# Patient Record
Sex: FEMALE | Race: WHITE | NOT HISPANIC OR LATINO | Employment: OTHER | URBAN - METROPOLITAN AREA
[De-identification: names, ages, dates, MRNs, and addresses within clinical notes are randomized per-mention and may not be internally consistent; named-entity substitution may affect disease eponyms.]

---

## 2018-05-04 ENCOUNTER — TELEPHONE (OUTPATIENT)
Dept: BARIATRICS | Facility: CLINIC | Age: 43
End: 2018-05-04

## 2018-05-04 NOTE — TELEPHONE ENCOUNTER
Patient called stating that she was faxing over medical release information signed and completed so we can fax to her PCP and other medical doctors  She is interested in revision after weight gain  Surgery in 2005  LM with [facility] Faxing to look for above

## 2018-05-15 NOTE — TELEPHONE ENCOUNTER
Received email from patient asking if we received any of the records  I emailed her back stating that I faxed all the papers over to the doctors she signed releases for  She tried to contact those other providers  I called central faxing to find the copies of the releases so I can call the offices myself and see when they will be releasing the records to us

## 2018-05-16 NOTE — TELEPHONE ENCOUNTER
Have records from [facility]  Also received records from [facility]   for  [facility] and [facility] waiting to hear back on if/when they faxed records

## 2018-06-06 NOTE — TELEPHONE ENCOUNTER
Received fax from [facility]  Cover sheet stated that they originally faxed all the records on 5/9/2018 to [phone]  Which is close to the Lyn fax #   I sent the cover sheet back correcting the fax #  Send email to patient stating that we now have records of [facility] and are still waiting for [facility]

## 2018-06-06 NOTE — TELEPHONE ENCOUNTER
LM again with [facility] asking for status of YAMILETH since it was faxed over than 30 days ago

## 2018-06-07 NOTE — TELEPHONE ENCOUNTER
Received call from [facility] stating that 28 pages were faxed over to [phone] on 5/16/2018 at 10:59am    for central faxing to check for the fax and call me back

## 2018-06-08 NOTE — TELEPHONE ENCOUNTER
Received records via central faxing  Advised patient that the records would be interofficed to the [facility] for surgeon review and someone from that office would be contacting her  [facility] office aware that records are being interofficed to them

## 2018-06-20 DIAGNOSIS — Z98.84 BARIATRIC SURGERY STATUS: Primary | ICD-10-CM

## 2018-06-27 ENCOUNTER — HOSPITAL ENCOUNTER (OUTPATIENT)
Dept: RADIOLOGY | Facility: HOSPITAL | Age: 43
Discharge: HOME | End: 2018-06-27
Payer: COMMERCIAL

## 2018-06-27 DIAGNOSIS — Z98.84 BARIATRIC SURGERY STATUS: ICD-10-CM

## 2018-06-27 PROCEDURE — 74240 X-RAY XM UPR GI TRC 1CNTRST: CPT

## 2018-07-16 RX ORDER — DIAZEPAM 5 MG/1
TABLET ORAL
COMMUNITY
Start: 2018-06-26

## 2018-07-16 RX ORDER — METOPROLOL SUCCINATE 50 MG/1
50 TABLET, EXTENDED RELEASE ORAL
COMMUNITY

## 2018-07-16 RX ORDER — BACLOFEN 10 MG/1
TABLET ORAL
Status: ON HOLD | COMMUNITY
Start: 2017-11-03 | End: 2018-08-29 | Stop reason: ALTCHOICE

## 2018-07-16 RX ORDER — DULOXETIN HYDROCHLORIDE 60 MG/1
CAPSULE, DELAYED RELEASE ORAL
Status: ON HOLD | COMMUNITY
Start: 2017-11-29 | End: 2018-08-29 | Stop reason: ALTCHOICE

## 2018-07-16 RX ORDER — PREGABALIN 150 MG/1
CAPSULE ORAL 2 TIMES DAILY
COMMUNITY
Start: 2017-12-01

## 2018-07-16 RX ORDER — CLONAZEPAM 0.5 MG/1
TABLET ORAL
COMMUNITY
Start: 2018-05-03

## 2018-07-16 RX ORDER — METHOCARBAMOL 750 MG/1
TABLET, FILM COATED ORAL EVERY 8 HOURS SCHEDULED
COMMUNITY
Start: 2017-11-28

## 2018-07-16 RX ORDER — PANTOPRAZOLE SODIUM 20 MG/1
40 TABLET, DELAYED RELEASE ORAL
COMMUNITY
Start: 2018-01-12 | End: 2019-01-12

## 2018-07-19 ENCOUNTER — OFFICE VISIT (OUTPATIENT)
Dept: BARIATRICS | Facility: CLINIC | Age: 43
End: 2018-07-19
Payer: COMMERCIAL

## 2018-07-19 VITALS
WEIGHT: 206 LBS | HEART RATE: 77 BPM | TEMPERATURE: 98 F | BODY MASS INDEX: 40.44 KG/M2 | HEIGHT: 60 IN | DIASTOLIC BLOOD PRESSURE: 70 MMHG | SYSTOLIC BLOOD PRESSURE: 112 MMHG | RESPIRATION RATE: 18 BRPM

## 2018-07-19 DIAGNOSIS — Z98.84 BARIATRIC SURGERY STATUS: ICD-10-CM

## 2018-07-19 DIAGNOSIS — R63.5 ABNORMAL WEIGHT GAIN: ICD-10-CM

## 2018-07-19 DIAGNOSIS — E66.01 MORBID (SEVERE) OBESITY DUE TO EXCESS CALORIES (HCC): Primary | ICD-10-CM

## 2018-07-19 DIAGNOSIS — K91.2 POSTSURGICAL MALABSORPTION: ICD-10-CM

## 2018-07-19 PROCEDURE — 99213 OFFICE O/P EST LOW 20 MIN: CPT | Performed by: SURGERY

## 2018-07-19 RX ORDER — LORAZEPAM 1 MG/1
TABLET ORAL
Refills: 0 | Status: ON HOLD | COMMUNITY
Start: 2018-05-02 | End: 2018-08-29 | Stop reason: SDUPTHER

## 2018-07-19 RX ORDER — TRAZODONE HYDROCHLORIDE 50 MG/1
25 TABLET ORAL DAILY
COMMUNITY
Start: 2018-07-06 | End: 2018-08-05

## 2018-07-19 RX ORDER — OXYCODONE AND ACETAMINOPHEN 7.5; 325 MG/1; MG/1
TABLET ORAL
Refills: 0 | COMMUNITY
Start: 2018-07-11

## 2018-07-19 RX ORDER — SERTRALINE HYDROCHLORIDE 25 MG/1
TABLET, FILM COATED ORAL
COMMUNITY
Start: 2018-07-06

## 2018-07-19 NOTE — PROGRESS NOTES
INITIAL VISIT - BARIATRIC SURGERY  Chelo Sheldon 44 y o  female MRN: [id number]  Unit/Bed#:  Encounter: [id number]      HPI:  Chelo Sheldon is a 44 y o  female who presents with a history of gastric bypass in 2005 by Dr Tejal De Jesus  She has been gaining weight  Review of Systems   All other systems reviewed and are negative  Historical Information   Past Medical History:   Diagnosis Date    Acid reflux     Anxiety     Arthritis     Depression     Heartburn     Hypertelorism     Lumbar disc disease     Morbid obesity ([address] )      Past Surgical History:   Procedure Laterality Date    APPENDECTOMY      BACK SURGERY      GALLBLADDER SURGERY      GASTRIC BYPASS  2005    HERNIA REPAIR      TONSILLECTOMY       Social History   History   Alcohol Use No     History   Drug Use No     History   Smoking Status    Never Smoker   Smokeless Tobacco    Never Used     Family History: non-contributory    Meds/Allergies   all medications and allergies reviewed  Allergies   Allergen Reactions    Hydromorphone Itching    Morphine Itching       Objective       Current Vitals:   Blood Pressure: 112/70 (07/19/18 1119)  Pulse: 77 (07/19/18 1119)  Temperature: 98 °F (36 7 °C) (07/19/18 1119)  Respirations: 18 (07/19/18 1119)  Height: 5' (152 4 cm) (07/19/18 1119)  Weight - Scale: 93 4 kg (206 lb) (07/19/18 1119)        Invasive Devices          No matching active lines, drains, or airways          Physical Exam   Constitutional: She is oriented to person, place, and time  She appears well-developed and well-nourished  No distress  Neurological: She is alert and oriented to person, place, and time  Psychiatric: She has a normal mood and affect  Her behavior is normal  Judgment and thought content normal    Vitals reviewed  Lab Results: I have personally reviewed pertinent lab results  Imaging: I have personally reviewed pertinent reports      EKG, Pathology, and Other Studies: I have personally reviewed pertinent reports  She had an upper GI done on June 20th of this year  No suspicious abnormality seen  The esophagus and gastric pouch and visualized proximal small bowel appear relatively typical after Daniel-en-Y gastric bypass procedure  Assessment/PLAN:    44 y o  female with a history of laparoscopic gastric bypass in 2005 by Dr Leroy Zamora  At her highest weight was 261 pounds and at her usha she went down to 173  Over the last 5 years she has regained 33 pounds back  Within the last 8 years she has had 3 back surgeries that have significantly impaired her mobility and ability to perform physical activity  She has intermittent nausea and heartburn  I am scheduling her to have an EGD to further evaluate the anatomy of her gastric bypass and to rule out any potential reason that would explain her weight gain  I am ordering nutritional labs to assess her status  I had a detailed discussion with her stressing the fact that long-term success is largely dependent on compliance and abidance to the recommendations of the program as well as participation within the support groups  She is committed to continue to observe her diet and to increase her physical activity  She will follow up with us as scheduled after the endoscopy is done      Tatyana Zendejas MD  7/19/2018  11:35 AM

## 2018-07-19 NOTE — LETTER
July 19, 2018     [address] [facility] [address]    Patient: Kylie Cotton   YOB: 1975   Date of Visit: 7/19/2018       Dear Dr Karen Ashford: Thank you for referring Waqar Souza to me for evaluation  Below are my notes for this consultation  If you have questions, please do not hesitate to call me  I look forward to following your patient along with you  Sincerely,        Willy Messina MD        CC: No Recipients  Willy Messina MD  7/19/2018 11:47 AM  Sign at close encounter    INITIAL VISIT - BARIATRIC SURGERY  Kylie Cotton 44 y o  female MRN: [id number]  Unit/Bed#:  Encounter: [id number]      HPI:  Kylie Cotton is a 44 y o  female who presents with a history of gastric bypass in 2005 by Dr Ria Blanton  She has been gaining weight  Review of Systems   All other systems reviewed and are negative        Historical Information   Past Medical History:   Diagnosis Date    Acid reflux     Anxiety     Arthritis     Depression     Heartburn     Hypertelorism     Lumbar disc disease     Morbid obesity ([address] )      Past Surgical History:   Procedure Laterality Date    APPENDECTOMY      BACK SURGERY      GALLBLADDER SURGERY      GASTRIC BYPASS  2005    HERNIA REPAIR      TONSILLECTOMY       Social History   History   Alcohol Use No     History   Drug Use No     History   Smoking Status    Never Smoker   Smokeless Tobacco    Never Used     Family History: non-contributory    Meds/Allergies   all medications and allergies reviewed  Allergies   Allergen Reactions    Hydromorphone Itching    Morphine Itching       Objective       Current Vitals:   Blood Pressure: 112/70 (07/19/18 1119)  Pulse: 77 (07/19/18 1119)  Temperature: 98 °F (36 7 °C) (07/19/18 1119)  Respirations: 18 (07/19/18 1119)  Height: 5' (152 4 cm) (07/19/18 1119)  Weight - Scale: 93 4 kg (206 lb) (07/19/18 1119)        Invasive Devices          No matching active lines, drains, or airways          Physical Exam   Constitutional: She is oriented to person, place, and time  She appears well-developed and well-nourished  No distress  Neurological: She is alert and oriented to person, place, and time  Psychiatric: She has a normal mood and affect  Her behavior is normal  Judgment and thought content normal    Vitals reviewed  Lab Results: I have personally reviewed pertinent lab results  Imaging: I have personally reviewed pertinent reports  EKG, Pathology, and Other Studies: I have personally reviewed pertinent reports  She had an upper GI done on June 20th of this year  No suspicious abnormality seen  The esophagus and gastric pouch and visualized proximal small bowel appear relatively typical after Daniel-en-Y gastric bypass procedure  Assessment/PLAN:    44 y o  female with a history of laparoscopic gastric bypass in 2005 by Dr Anne Avery  At her highest weight was 261 pounds and at her usha she went down to 173  Over the last 5 years she has regained 33 pounds back  Within the last 8 years she has had 3 back surgeries that have significantly impaired her mobility and ability to perform physical activity  She has intermittent nausea and heartburn  I am scheduling her to have an EGD to further evaluate the anatomy of her gastric bypass and to rule out any potential reason that would explain her weight gain  I am ordering nutritional labs to assess her status  I had a detailed discussion with her stressing the fact that long-term success is largely dependent on compliance and abidance to the recommendations of the program as well as participation within the support groups  She is committed to continue to observe her diet and to increase her physical activity  She will follow up with us as scheduled after the endoscopy is done      Ellie Vargas MD  7/19/2018  11:35 AM

## 2018-08-28 ENCOUNTER — ANESTHESIA EVENT (OUTPATIENT)
Dept: GASTROENTEROLOGY | Facility: HOSPITAL | Age: 43
End: 2018-08-28
Payer: COMMERCIAL

## 2018-08-29 ENCOUNTER — ANESTHESIA (OUTPATIENT)
Dept: GASTROENTEROLOGY | Facility: HOSPITAL | Age: 43
End: 2018-08-29
Payer: COMMERCIAL

## 2018-08-29 ENCOUNTER — HOSPITAL ENCOUNTER (OUTPATIENT)
Facility: HOSPITAL | Age: 43
Setting detail: OUTPATIENT SURGERY
Discharge: HOME | End: 2018-08-29
Attending: SURGERY | Admitting: SURGERY
Payer: COMMERCIAL

## 2018-08-29 VITALS
WEIGHT: 207 LBS | HEART RATE: 60 BPM | BODY MASS INDEX: 40.64 KG/M2 | HEIGHT: 60 IN | DIASTOLIC BLOOD PRESSURE: 63 MMHG | SYSTOLIC BLOOD PRESSURE: 110 MMHG | TEMPERATURE: 97.9 F | RESPIRATION RATE: 16 BRPM | OXYGEN SATURATION: 97 %

## 2018-08-29 DIAGNOSIS — Z98.84 BARIATRIC SURGERY STATUS: ICD-10-CM

## 2018-08-29 LAB — EXT PREGNANCY TEST URINE: NEGATIVE

## 2018-08-29 PROCEDURE — 88342 IMHCHEM/IMCYTCHM 1ST ANTB: CPT | Performed by: PATHOLOGY

## 2018-08-29 PROCEDURE — 43239 EGD BIOPSY SINGLE/MULTIPLE: CPT | Performed by: SURGERY

## 2018-08-29 PROCEDURE — 81025 URINE PREGNANCY TEST: CPT | Performed by: ANESTHESIOLOGY

## 2018-08-29 PROCEDURE — 88305 TISSUE EXAM BY PATHOLOGIST: CPT | Performed by: PATHOLOGY

## 2018-08-29 RX ORDER — SODIUM CHLORIDE 9 MG/ML
125 INJECTION, SOLUTION INTRAVENOUS CONTINUOUS
Status: DISCONTINUED | OUTPATIENT
Start: 2018-08-29 | End: 2018-08-29 | Stop reason: HOSPADM

## 2018-08-29 RX ORDER — PROPOFOL 10 MG/ML
INJECTION, EMULSION INTRAVENOUS AS NEEDED
Status: DISCONTINUED | OUTPATIENT
Start: 2018-08-29 | End: 2018-08-29 | Stop reason: SURG

## 2018-08-29 RX ADMIN — PROPOFOL 120 MG: 10 INJECTION, EMULSION INTRAVENOUS at 09:10

## 2018-08-29 RX ADMIN — PROPOFOL 50 MG: 10 INJECTION, EMULSION INTRAVENOUS at 09:13

## 2018-08-29 RX ADMIN — SODIUM CHLORIDE 125 ML/HR: 0.9 INJECTION, SOLUTION INTRAVENOUS at 08:04

## 2018-08-29 RX ADMIN — PROPOFOL 30 MG: 10 INJECTION, EMULSION INTRAVENOUS at 09:16

## 2018-08-29 RX ADMIN — LIDOCAINE HYDROCHLORIDE 100 MG: 20 INJECTION, SOLUTION INTRAVENOUS at 09:10

## 2018-08-29 NOTE — ANESTHESIA PREPROCEDURE EVALUATION
Review of Systems/Medical History  Patient summary reviewed  Chart reviewed      Cardiovascular  Exercise tolerance (METS): >4,  Hypertension controlled,    Pulmonary  Negative pulmonary ROS        GI/Hepatic    GERD poorly controlled,        Negative  ROS        Endo/Other    Obesity    GYN  Negative gynecology ROS          Hematology  Negative hematology ROS      Musculoskeletal    Arthritis     Neurology  Negative neurology ROS      Psychology   Anxiety, Depression , being treated for depression,              Physical Exam    Airway    Mallampati score: II  TM Distance: <3 FB  Neck ROM: full     Dental       Cardiovascular  Rhythm: regular, Rate: normal,     Pulmonary  Breath sounds clear to auscultation,     Other Findings        Anesthesia Plan  ASA Score- 2     Anesthesia Type- IV sedation with anesthesia with ASA Monitors  Additional Monitors:   Airway Plan:         Plan Factors- Patient instructed to abstain from smoking on day of procedure  Patient did not smoke on day of surgery  Induction- intravenous  Postoperative Plan-     Informed Consent- Anesthetic plan and risks discussed with patient

## 2018-08-29 NOTE — PROGRESS NOTES
D/C instructions given and pt verbalizes understanding  Dr Justus Draper was here to talk with pt and

## 2018-08-29 NOTE — OP NOTE
[facility]   [address] [facility], [address]  [phone] (Fax)      Operative Report  ESOPHAGOGASTRODUODENOSCOPY (EGD) with bx     Patient Name: Santosh Messina    :  1975  MRN: [id number]  Patient Location: [facility] ROOM 01  Surgery Date : 2018  Surgeons:  Surgeon(s) and Role:     * Faiza Braun MD - Primary    Diagnosis:    Pre-Op Diagnosis Codes:  Bariatric surgery status [Z98 84]  Weight recidivism    Post-Op Diagnosis Codes: * Bariatric surgery status [Z98 84]     * Weight Recidivism     * Pouchitis    Procedure  1  Endoscopy with Biopsy    Specimen(s):  ID Type Source Tests Collected by Time Destination   1 : gastric pouch bx r/o h  pilori Tissue Stomach TISSUE EXAM Faiza Braun MD 2018 [id number]        Estimated Blood Loss:    Minimal      Anesthesia Type:     IV Sedation with Anesthesia    Operative Indications:    Bariatric surgery status [Z98 84]  Weight recidivism    Operative Findings:    Pouchitis  Mildly prominent pouch    Complications:     None    Procedure and Technique:       Indication for the procedure     The patient is a 44 y o  female with a history of laparoscopic gastric bypass in 2005 by Dr Olive Ricardo  At her highest weight was 261 pounds and at her usha she went down to 173  Over the last 5 years she has regained 33 pounds back      Within the last 8 years she has had 3 back surgeries that have significantly impaired her mobility and ability to perform physical activity      She has intermittent nausea and heartburn  Upper GI was performed a revealed a relatively normal anatomy status post gastric bypass      She was scheduled to have an EGD to further evaluate the anatomy of her gastric bypass and to rule out any potential reason that would explain her weight gain      Operative Technique     The patient was brought to the GI suite and was placed in a supine position    EKG trace, pulse, blood pressure and oxygen saturation were monitored throughout the procedure  A time out was called and the patient was identified by name and arm band  The patient was placed in a left lateral decubitus position and received IV sedation with propofol by the anesthesia staff  The endoscope was introduced through the mouth and advanced under under direct visualization  The esophagus was normal in appearance  The gastric pouch was mildly prominent and showed evidence of  inflammation  There was no evidence of marginal ulceration, gastrogastric fistula or anastomotic stricture  I was able to pass the scope through the anastomosis into the Daniel limb without any difficulty and went down 70 cm distally not finding any other abnormalities  A biopsy was taken from the pouch times two with a forceps grasper to rule out the presence of H  Pylori  The GE junction was again inspected upon withdrawing the scope and was normal in appearance      Impression     Pouchitis with a mild prominent gastric pouch  Not unusual 13 years postop  No evidence of gastrogastric fistula, marginal ulcer or anastomotic stricture      Patient Disposition:    PACU     Signature: Ankit Mendoza MD  Date: August 29, 2018  Time: 9:18 AM

## 2018-08-29 NOTE — DISCHARGE INSTRUCTIONS
Gastritis   WHAT YOU NEED TO KNOW:   Gastritis is inflammation or irritation of the lining of your stomach  DISCHARGE INSTRUCTIONS:   Call 911 for any of the following:   · You develop chest pain or shortness of breath  Seek care immediately if:   · You vomit blood  · You have black or bloody bowel movements  · You have severe stomach or back pain  Contact your healthcare provider if:   · You have a fever  · You have new or worsening symptoms, even after treatment  · You have questions or concerns about your condition or care  Medicines:   · Medicines  may be given to help treat a bacterial infection or decrease stomach acid  · Take your medicine as directed  Contact your healthcare provider if you think your medicine is not helping or if you have side effects  Tell him or her if you are allergic to any medicine  Keep a list of the medicines, vitamins, and herbs you take  Include the amounts, and when and why you take them  Bring the list or the pill bottles to follow-up visits  Carry your medicine list with you in case of an emergency  Manage or prevent gastritis:   · Do not smoke  Nicotine and other chemicals in cigarettes and cigars can make your symptoms worse and cause lung damage  Ask your healthcare provider for information if you currently smoke and need help to quit  E-cigarettes or smokeless tobacco still contain nicotine  Talk to your healthcare provider before you use these products  · Do not drink alcohol  Alcohol can prevent healing and make your gastritis worse  Talk to your healthcare provider if you need help to stop drinking  · Do not take NSAIDs or aspirin unless directed  These and similar medicines can cause irritation  If your healthcare provider says it is okay to take NSAIDs, take them with food  · Do not eat foods that cause irritation  Foods such as oranges and salsa can cause burning or pain  Eat a variety of healthy foods   Examples include fruits (not citrus), vegetables, low-fat dairy products, beans, whole-grain breads, and lean meats and fish  Try to eat small meals, and drink water with your meals  Do not eat for at least 3 hours before you go to bed  · Find ways to relax and decrease stress  Stress can increase stomach acid and make gastritis worse  Activities such as yoga, meditation, or listening to music can help you relax  Spend time with friends, or do things you enjoy  Follow up with your healthcare provider as directed: You may need ongoing tests or treatment, or referral to a gastroenterologist  Write down your questions so you remember to ask them during your visits  © 2017 [address] Information is for End User's use only and may not be sold, redistributed or otherwise used for commercial purposes  All illustrations and images included in CareNotes® are the copyrighted property of [employer] M [employer] Inc  or [employer]  The above information is an  only  It is not intended as medical advice for individual conditions or treatments  Talk to your doctor, nurse or pharmacist before following any medical regimen to see if it is safe and effective for you  Upper Endoscopy   WHAT YOU NEED TO KNOW:   An upper endoscopy is also called an upper gastrointestinal (GI) endoscopy, or an esophagogastroduodenoscopy (EGD)  You may feel bloated, gassy, or have some abdominal discomfort after your procedure  Your throat may be sore for 24 to 36 hours  You may burp or pass gas from air that is still inside your body  DISCHARGE INSTRUCTIONS:   Call 911 for any of the following:   · You have sudden chest pain or trouble breathing  Seek care immediately if:   · You feel dizzy or faint  · You have trouble swallowing  · Your bowel movements are very dark or black  · Your abdomen is hard and firm and you have severe pain  · You vomit blood    Contact your healthcare provider if:   · You feel full or bloated and cannot burp or pass gas  · You have not had a bowel movement for 3 days after your procedure  · You have neck pain  · You have a fever or chills  · You have nausea or are vomiting  · You have a rash or hives  · You have questions or concerns about your endoscopy  Relieve a sore throat:  Suck on throat lozenges or crushed ice  Gargle with a small amount of warm salt water  Mix 1 teaspoon of salt and 1 cup of warm water to make salt water  Relieve gas and discomfort from bloating:  Lie on your right side with a heating pad on your abdomen  Take short walks to help pass gas  Eat small meals until bloating is relieved  Rest after your procedure: You have been given medicine to relax you  Do not  drive or make important decisions until the day after your procedure  Return to your normal activity as directed  You can usually return to work the day after your procedure  Follow up with your healthcare provider as directed:  Write down your questions so you remember to ask them during your visits  © 2017 [address] [employer] is for End User's use only and may not be sold, redistributed or otherwise used for commercial purposes  All illustrations and images included in CareNotes® are the copyrighted property of A D A M , Inc  or [employer]  The above information is an  only  It is not intended as medical advice for individual conditions or treatments  Talk to your doctor, nurse or pharmacist before following any medical regimen to see if it is safe and effective for you

## 2018-08-29 NOTE — H&P
This is a 44 y o  female with a history of laparoscopic gastric bypass in 2005 by Dr Martinez Found  At her highest weight was 261 pounds and at her usha she went down to 173  Over the last 5 years she has regained 33 pounds back      Within the last 8 years she has had 3 back surgeries that have significantly impaired her mobility and ability to perform physical activity      She has intermittent nausea and heartburn  Upper GI was performed a revealed a relatively normal anatomy status post gastric bypass      She was scheduled to have an EGD to further evaluate the anatomy of her gastric bypass and to rule out any potential reason that would explain her weight gain  Physical Exam    AAOx3  RRR  CTA B  Abdomen obese  Benign  A/P:    This is a 44 y o  female with a history of a gastric bypass in 2005 now with weight recidivism  Will proceed with the EGD and biopsies        Louis Lewis MD  08/29/18  9:04 AM

## 2018-08-29 NOTE — ANESTHESIA POSTPROCEDURE EVALUATION
Post-Op Assessment Note      CV Status:  Stable    Mental Status:  Alert and awake    Hydration Status:  Euvolemic    PONV Controlled:  Controlled    Airway Patency:  Patent    Post Op Vitals Reviewed: Yes          Staff: Anesthesiologist           BP 92/51 (08/29/18 0919)    Temp      Pulse 63 (08/29/18 0919)   Resp 22 (08/29/18 0919)    SpO2 98 % (08/29/18 0919)

## 2018-09-27 ENCOUNTER — TELEPHONE (OUTPATIENT)
Dept: BARIATRICS | Facility: CLINIC | Age: 43
End: 2018-09-27

## 2024-01-30 ENCOUNTER — TELEPHONE (OUTPATIENT)
Age: 49
End: 2024-01-30

## (undated) DEVICE — SINGLE-USE BIOPSY FORCEPS: Brand: RADIAL JAW 4